# Patient Record
(demographics unavailable — no encounter records)

---

## 2025-02-26 NOTE — HISTORY OF PRESENT ILLNESS
[Patient reported PAP Smear was normal] : Patient reported PAP Smear was normal [Y] : Patient is sexually active [N] : Patient denies prior pregnancies [PapSmeardate] : 12/2024 [LMPDate] : 02/11/25 [MensesLength] : 2 [Regular Cycle Intervals] : periods have been regular [Frequency: Q ___ days] : menstrual periods occur approximately every [unfilled] days [Menarche Age: ____] : age at menarche was [unfilled] [FreeTextEntry1] : 02/11/25 [Currently Active] : currently active [Men] : men [No] : No [Condoms] : Condoms [Patient refuses STI testing] : Patient refuses STI testing

## 2025-02-26 NOTE — REASON FOR VISIT
1. Pelvic pain in female  This is a 65 yo female with pelvic pain - no specific localizing symptoms - is concerned it has to do with her inability to lose weight.  Has some dysuria; has not been sexually active x years, so likely not STI.  Will check UA/UC, and pelvic ultrasound.    - UA with Microscopic - lab collect; Future  - Urine Culture Aerobic Bacterial - lab collect; Future  - UA with Microscopic - lab collect  - Urine Culture Aerobic Bacterial - lab collect  - Urine Microscopic Exam  - US Pelvic Complete with Transvaginal; Future  - CBC with Platelets & Differential; Future  - CBC with Platelets & Differential    2. Prediabetes  H/o prediabetes - elevated A1c in past - will recheck - consider Wegovy  - Hemoglobin A1c; Future  - Comprehensive metabolic panel (BMP + Alb, Alk Phos, ALT, AST, Total. Bili, TP); Future  - Semaglutide-Weight Management (WEGOVY) 0.25 MG/0.5ML pen; Inject 0.25 mg Subcutaneous once a week (Patient not taking: Reported on 1/23/2024)  Dispense: 2 mL; Refill: 0  - Hemoglobin A1c  - Comprehensive metabolic panel (BMP + Alb, Alk Phos, ALT, AST, Total. Bili, TP)    3. Morbid obesity (H)  H/o morbid obesity -   Body mass index is 47.66 kg/m .  Discussed - as above - will try to get Wegovy  - Comprehensive metabolic panel (BMP + Alb, Alk Phos, ALT, AST, Total. Bili, TP); Future  - Semaglutide-Weight Management (WEGOVY) 0.25 MG/0.5ML pen; Inject 0.25 mg Subcutaneous once a week (Patient not taking: Reported on 1/23/2024)  Dispense: 2 mL; Refill: 0  - Comprehensive metabolic panel (BMP + Alb, Alk Phos, ALT, AST, Total. Bili, TP)    4. Screen for colon cancer  Due for colon cancer screening - discussed - ordered colonoscopy   - Colonoscopy Screening  Referral; Future      Subjective   Patsy is a 66 year old, presenting for the following health issues:  Abdominal Pain (/)        1/17/2024     9:19 AM   Additional Questions   Roomed by Bernadine     Pain/cramping in lower abdomen -  "  Working out at gym since November - can't lose belly fat -   Pain > 1 week   Hurts to cough   Hasn't been doing a lot of coughing -   Hurt in lower abdomen   No change in stools   Some constipation   Hard to pee - hs to push to void   No burning -     History of Present Illness       Reason for visit:  Lower abdominal pain  Symptom onset:  3-7 days ago  Symptom intensity:  Mild  Symptom progression:  Staying the same  Had these symptoms before:  No              Objective    /56 (BP Location: Right arm, Patient Position: Sitting, Cuff Size: Adult Large)   Pulse 68   Temp 97.2  F (36.2  C) (Temporal)   Resp 20   Ht 1.6 m (5' 2.99\")   Wt 122 kg (269 lb)   LMP  (LMP Unknown)   SpO2 95%   BMI 47.66 kg/m    Body mass index is 47.66 kg/m .  Review of Systems   Constitutional:  Positive for fatigue. Negative for chills and fever.   HENT:  Negative for ear pain and sore throat.    Eyes:  Negative for pain and visual disturbance.   Respiratory:  Negative for cough and shortness of breath.    Cardiovascular:  Negative for chest pain and palpitations.   Gastrointestinal:  Negative for abdominal pain and vomiting.   Endocrine: Negative for polydipsia and polyuria.   Genitourinary:  Positive for dysuria and pelvic pain. Negative for hematuria and vaginal discharge.   Musculoskeletal:  Negative for arthralgias and back pain.   Skin:  Negative for color change and rash.   Allergic/Immunologic: Negative.    Neurological:  Negative for seizures and syncope.   Hematological:  Does not bruise/bleed easily.   Psychiatric/Behavioral:  The patient is nervous/anxious.    All other systems reviewed and are negative.      Physical Exam  Vitals reviewed.   Constitutional:       General: She is not in acute distress.     Appearance: Normal appearance.   HENT:      Head: Normocephalic.      Right Ear: Tympanic membrane, ear canal and external ear normal.      Left Ear: Tympanic membrane, ear canal and external ear normal.      " Nose: Nose normal.      Mouth/Throat:      Mouth: Mucous membranes are moist.      Pharynx: No posterior oropharyngeal erythema.   Eyes:      Extraocular Movements: Extraocular movements intact.      Conjunctiva/sclera: Conjunctivae normal.      Pupils: Pupils are equal, round, and reactive to light.   Cardiovascular:      Rate and Rhythm: Normal rate and regular rhythm.      Pulses: Normal pulses.      Heart sounds: Normal heart sounds. No murmur heard.  Pulmonary:      Effort: Pulmonary effort is normal.      Breath sounds: Normal breath sounds.   Abdominal:      Palpations: Abdomen is soft. There is no mass.      Tenderness: There is no abdominal tenderness. There is no guarding or rebound.   Genitourinary:     Comments:     Musculoskeletal:         General: No deformity. Normal range of motion.      Cervical back: Normal range of motion and neck supple.   Lymphadenopathy:      Cervical: No cervical adenopathy.   Skin:     General: Skin is warm and dry.   Neurological:      General: No focal deficit present.      Mental Status: She is alert.   Psychiatric:         Mood and Affect: Mood normal.         Behavior: Behavior normal.            Results for orders placed or performed in visit on 01/17/24   UA with Microscopic - lab collect     Status: Normal   Result Value Ref Range    Color Urine Yellow Colorless, Straw, Light Yellow, Yellow    Appearance Urine Clear Clear    Glucose Urine Negative Negative mg/dL    Bilirubin Urine Negative Negative    Ketones Urine Negative Negative mg/dL    Specific Gravity Urine 1.015 1.005 - 1.030    Blood Urine Negative Negative    pH Urine 6.0 5.0 - 8.0    Protein Albumin Urine Negative Negative mg/dL    Urobilinogen Urine 1.0 0.2, 1.0 E.U./dL    Nitrite Urine Negative Negative    Leukocyte Esterase Urine Negative Negative   Urine Microscopic Exam     Status: Abnormal   Result Value Ref Range    Bacteria Urine Few (A) None Seen /HPF    RBC Urine None Seen 0-2 /HPF /HPF    WBC  Urine 0-5 0-5 /HPF /HPF    Squamous Epithelials Urine Moderate (A) None Seen /LPF    Mucus Urine Present (A) None Seen /LPF   Hemoglobin A1c     Status: Abnormal   Result Value Ref Range    Hemoglobin A1C 6.2 (H) 0.0 - 5.6 %   Comprehensive metabolic panel (BMP + Alb, Alk Phos, ALT, AST, Total. Bili, TP)     Status: Abnormal   Result Value Ref Range    Sodium 140 135 - 145 mmol/L    Potassium 4.9 3.4 - 5.3 mmol/L    Carbon Dioxide (CO2) 28 22 - 29 mmol/L    Anion Gap 10 7 - 15 mmol/L    Urea Nitrogen 5.9 (L) 8.0 - 23.0 mg/dL    Creatinine 0.59 0.51 - 0.95 mg/dL    GFR Estimate >90 >60 mL/min/1.73m2    Calcium 9.6 8.8 - 10.2 mg/dL    Chloride 102 98 - 107 mmol/L    Glucose 113 (H) 70 - 99 mg/dL    Alkaline Phosphatase 83 40 - 150 U/L    AST 24 0 - 45 U/L    ALT 16 0 - 50 U/L    Protein Total 6.9 6.4 - 8.3 g/dL    Albumin 4.0 3.5 - 5.2 g/dL    Bilirubin Total 0.3 <=1.2 mg/dL   CBC with platelets and differential     Status: None   Result Value Ref Range    WBC Count 6.9 4.0 - 11.0 10e3/uL    RBC Count 5.10 3.80 - 5.20 10e6/uL    Hemoglobin 14.9 11.7 - 15.7 g/dL    Hematocrit 45.8 35.0 - 47.0 %    MCV 90 78 - 100 fL    MCH 29.2 26.5 - 33.0 pg    MCHC 32.5 31.5 - 36.5 g/dL    RDW 13.5 10.0 - 15.0 %    Platelet Count 199 150 - 450 10e3/uL    % Neutrophils 51 %    % Lymphocytes 35 %    % Monocytes 9 %    % Eosinophils 4 %    % Basophils 1 %    % Immature Granulocytes 0 %    Absolute Neutrophils 3.5 1.6 - 8.3 10e3/uL    Absolute Lymphocytes 2.5 0.8 - 5.3 10e3/uL    Absolute Monocytes 0.6 0.0 - 1.3 10e3/uL    Absolute Eosinophils 0.3 0.0 - 0.7 10e3/uL    Absolute Basophils 0.0 0.0 - 0.2 10e3/uL    Absolute Immature Granulocytes 0.0 <=0.4 10e3/uL   Urine Culture Aerobic Bacterial - lab collect     Status: None    Specimen: Urine, NOS   Result Value Ref Range    Culture 10,000-50,000 CFU/mL Mixture of Urogenital Millie    CBC with Platelets & Differential     Status: None    Narrative    The following orders were created for  panel order CBC with Platelets & Differential.  Procedure                               Abnormality         Status                     ---------                               -----------         ------                     CBC with platelets and d...[[id number]]                      Final result                 Please view results for these tests on the individual orders.           Signed Electronically by: EJ WELLER MD      Prior to immunization administration, verified patients identity using patient s name and date of birth. Please see Immunization Activity for additional information.     Screening Questionnaire for Adult Immunization    Are you sick today?   No   Do you have allergies to medications, food, a vaccine component or latex?   No   Have you ever had a serious reaction after receiving a vaccination?   No   Do you have a long-term health problem with heart, lung, kidney, or metabolic disease (e.g., diabetes), asthma, a blood disorder, no spleen, complement component deficiency, a cochlear implant, or a spinal fluid leak?  Are you on long-term aspirin therapy?   Yes   Do you have cancer, leukemia, HIV/AIDS, or any other immune system problem?   No   Do you have a parent, brother, or sister with an immune system problem?   No   In the past 3 months, have you taken medications that affect  your immune system, such as prednisone, other steroids, or anticancer drugs; drugs for the treatment of rheumatoid arthritis, Crohn s disease, or psoriasis; or have you had radiation treatments?   No   Have you had a seizure, or a brain or other nervous system problem?   No   During the past year, have you received a transfusion of blood or blood    products, or been given immune (gamma) globulin or antiviral drug?   No   For women: Are you pregnant or is there a chance you could become       pregnant during the next month?   No   Have you received any vaccinations in the past 4 weeks?   No      Immunization questionnaire was positive for at least one answer.  Notified Dr. Wade.      Patient instructed to remain in clinic for 15 minutes afterwards, and to report any adverse reactions.     Screening performed by Bernadine Garcia CMA on 1/17/2024 at 9:23 AM.         [Initial] : an initial consultation for

## 2025-03-02 NOTE — HISTORY OF PRESENT ILLNESS
[Patient reported PAP Smear was normal] : Patient reported PAP Smear was normal [FreeTextEntry1] : had iud previously; had it removed (mirena) started ocp's but had bad reaction - headaches std done in dec all neg; pap 2024  [PapSmeardate] : 12/2024

## 2025-03-02 NOTE — COUNSELING
[Contraception/ Emergency Contraception/ Safe Sexual Practices] : contraception, emergency contraception, safe sexual practices [FreeTextEntry2] : iud discussion; R/B/A

## 2025-03-29 NOTE — PROCEDURE
[IUD Placement] : intrauterine device (IUD) placement [Mirena IUD] : Mirena IUD [Locate IUD] : locate IUD [Transvaginal Ultrasound] : transvaginal ultrasound [Prevention of Pregnancy] : prevention of pregnancy [Risks] : risks [Benefits] : benefits [Patient] : patient [Infection] : infection [Bleeding] : bleeding [Pain] : pain [Expulsion] : expulsion [Failure] : failure [Neg Pregnancy Test] : negative pregnancy test [Ibuprofen ___ mg] : ibuprofen [unfilled] ~Umg [Ring Forceps] : Ring forceps [Easy Passage] : Easy passage [Tolerated Well] : Patient tolerated the procedure well [No Complications] : No complications [Motrin/Ibuprofen] : Motrin/Ibuprofen [FreeTextEntry3] : IUD in place post insertion [de-identified] : 05/2027 [de-identified] : [id number]

## 2025-03-29 NOTE — PROCEDURE
[IUD Placement] : intrauterine device (IUD) placement [Mirena IUD] : Mirena IUD [Locate IUD] : locate IUD [Transvaginal Ultrasound] : transvaginal ultrasound [Prevention of Pregnancy] : prevention of pregnancy [Risks] : risks [Benefits] : benefits [Patient] : patient [Infection] : infection [Pain] : pain [Bleeding] : bleeding [Expulsion] : expulsion [Failure] : failure [Neg Pregnancy Test] : negative pregnancy test [Ibuprofen ___ mg] : ibuprofen [unfilled] ~Umg [Ring Forceps] : Ring forceps [Easy Passage] : Easy passage [Tolerated Well] : Patient tolerated the procedure well [No Complications] : No complications [Motrin/Ibuprofen] : Motrin/Ibuprofen [FreeTextEntry3] : IUD in place post insertion [de-identified] : [id number] [de-identified] : 05/2027